# Patient Record
(demographics unavailable — no encounter records)

---

## 2024-10-10 NOTE — DISCUSSION/SUMMARY
[FreeTextEntry1] : She is a 79 year-old woman with a history of hypertension, hypercholesterolemia, hypothyroidism, rheumatoid arthritis and severe obstructive sleep apnea. On CPAP of 12 cm of water without any symptoms. S/P knee surgery in the fall of 2021. This was complicated by DVT. Was on Eliquis. U/S 5/2/22: No evidence of DVT.  Impression Abdominal wall mass was noted to be a fat-containing umbilical hernia on CT Anemia Obstructive sleep apnea, well controlled Pulmonary nodule on CT scan -Mucoid impaction Right kidney lesion noted on CT - not well seen on US  H/O DVT  Recommend High-dose influenza vaccination given in the left deltoid Follow-up with gastroenterology advised Follow-up with endocrinology and rheumatology I will see her again in 3 months -Sooner if needed

## 2024-10-10 NOTE — PHYSICAL EXAM
[Normal Appearance] : normal appearance [General Appearance - In No Acute Distress] : no acute distress [Heart Rate And Rhythm] : heart rate and rhythm were normal [Heart Sounds] : normal S1 and S2 [Exaggerated Use Of Accessory Muscles For Inspiration] : no accessory muscle use [Auscultation Breath Sounds / Voice Sounds] : lungs were clear to auscultation bilaterally [Abnormal Walk] : normal gait [Cyanosis, Localized] : no localized cyanosis [3+ Pitting] : 3+  pitting  [Skin Turgor] : normal skin turgor [No Focal Deficits] : no focal deficits [Oriented To Time, Place, And Person] : oriented to person, place, and time [] : no respiratory distress

## 2024-10-10 NOTE — REVIEW OF SYSTEMS
[Hypertension] : ~T hypertension [Nasal Discharge] : nasal discharge [Heartburn] : heartburn [Back Pain] : ~T back pain [Arthralgias] : arthralgias [Thyroid Problem] : thyroid problem [Fatigue] : no fatigue [Recent Wt Gain (___ Lbs)] : no recent weight gain [Nasal Congestion] : no nasal congestion [Postnasal Drip] : no postnasal drip [Cough] : no cough [Dyspnea] : no dyspnea [Wheezing] : no wheezing [Palpitations] : no palpitations [Edema] : ~T edema was not present [Constipation] : no constipation [Diarrhea] : no diarrhea [Abdominal Pain] : no abdominal pain [Bleeding] : no bleeding [Dysuria] : no dysuria [Kyphoscoliosis] : no kyphoscoliosis [Myalgias] : no myalgias [Rash] : no [unfilled] rash [Anemia] : no anemia [Headache] : no headache [DVT] : no DVT

## 2024-10-10 NOTE — HISTORY OF PRESENT ILLNESS
[Never] : never [Obstructive Sleep Apnea] : obstructive sleep apnea [TextBox_4] : Has been seeing endocrinology.  She was asking me about vitamin D supplementation.  I supported her interest to do so.  No new physical complaints.  Continues to use CPAP.  Has not seen gastroenterology in several months.

## 2024-10-10 NOTE — PROCEDURE
[FreeTextEntry1] : PSG 4/23/12: Severe obstructive sleep apnea. AHI 55.9.   CT Chest 11/18/21: No evidence of pulmonary emboli. Small nodules, largest measuring 7 mm.  CT Chest 8/16/23: Branching tubular opacities noted in the right upper lobe and left lower lobe.  1.5 cm lesion in the right kidney.  Ultrasound advised.  CT of the abdomen and pelvis 7/25/2024: Fat-containing periumbilical hernia.  U/S 5/2/22: No evidence of DVT.  Renal US 2/29/24: Right renal lesion was not well-visualized.

## 2025-01-16 NOTE — REASON FOR VISIT
[Follow-up] : a follow-up of an existing diagnosis [FreeTextEntry1] : incomplete evacuation, anemia, gerd, colon cancer screening

## 2025-01-16 NOTE — HISTORY OF PRESENT ILLNESS
[de-identified] : 2/8/24: Erythema in the lower third of the esophagus, acute gastritis 9/2007: Severe gastritis, hiatal hernia, Schatzki ring [FreeTextEntry1] : 9/2007: Internal hemorrhoids, diverticulosis. Repeat 5-7 years

## 2025-01-16 NOTE — ASSESSMENT
[FreeTextEntry1] : 81 y/o female presents today for follow up results s/p EGD 2/8/24. Hx DM2 on metformin. Last seen by Dr. Byrd 1/4/24. EGD resulted with minimal inflammation. Reviewed results with patient. Patient states that she has been taking rabeprazole daily with noted improvement of GERD symptoms. Patient wants to finish rest of rabeprazole supply (90 days) and then follow up to possibly taper or switch to another medication. Patient is aware that she is due for a colonoscopy, wants to defer at this time. Denies any sob, cp or abdominal pain. Patient notes some stomach discomfort from metformin medication, which she started taking approximately 1 year ago.   1/16/25-  referred for colon cancer screening, last procedure  more than ten years ago. Pt requests colonoscopy. Hb 11.3 hx of DM. Has constipation. EGD 2024 was essentially normal. Recent gerd after eating eggplant parmesan; Using generic aciphex. Not using fiber.   Plan: 1. Continue rabeprazole 2. She was advised to undergo colonoscopy to which she  agreed. The procedure will be performed in [facility] with the assistance of an anesthesiologist. The procedure was explained in detail and she understood the risks of the procedure not limited  to infection, bleeding, perforation, risk of anesthesia and risk of IV site problems,emergency surgery, missed lesions  or non-diagnosis of colon cancer. She  was advised that she could not drive home alone, if the patient chooses to receive sedation. Further diagnostic and treatment recommendations will be based upon the procedure and any biopsies, if they are taken. 3. increaese fiber or use metamucil

## 2025-01-16 NOTE — PHYSICAL EXAM
[Alert] : alert [Normal Voice/Communication] : normal voice/communication [Healthy Appearing] : healthy appearing [No Acute Distress] : no acute distress [Sclera] : the sclera and conjunctiva were normal [Hearing Threshold Finger Rub Not [place]] : hearing was normal [Normal Lips/Gums] : the lips and gums were normal [Oropharynx] : the oropharynx was normal [Normal Appearance] : the appearance of the neck was normal [No Neck Mass] : no neck mass was observed [No Respiratory Distress] : no respiratory distress [No Acc Muscle Use] : no accessory muscle use [Respiration, Rhythm And Depth] : normal respiratory rhythm and effort [Auscultation Breath Sounds / Voice Sounds] : lungs were clear to auscultation bilaterally [Heart Rate And Rhythm] : heart rate was normal and rhythm regular [Normal S1, S2] : normal S1 and S2 [Murmurs] : no murmurs [+1] : edema: +1 [Bowel Sounds] : normal bowel sounds [Abdomen Tenderness] : non-tender [No Masses] : no abdominal mass palpated [Abdomen Soft] : soft [] : no hepatosplenomegaly [Oriented To Time, Place, And Person] : oriented to person, place, and time

## 2025-03-06 NOTE — DISCUSSION/SUMMARY
[FreeTextEntry1] : She is a 80 year-old woman with a history of hypertension, hypercholesterolemia, hypothyroidism, rheumatoid arthritis and severe obstructive sleep apnea. On CPAP of 12 cm of water without any symptoms. S/P knee surgery in the fall of 2021. This was complicated by DVT. Was on Eliquis. U/S 5/2/22: No evidence of DVT.  Impression DEREK on CPAP Pulmonary nodule on CT scan -Mucoid impaction Right kidney lesion noted on CT Umbilical hernia H/O DVT  Recommend Blood work obtained Colonoscopy planned for the near future Follow-up with endocrinology and rheumatology I will see her again in 3 to 6 months

## 2025-03-06 NOTE — PHYSICAL EXAM
[Normal Appearance] : normal appearance [General Appearance - In No Acute Distress] : no acute distress [Heart Rate And Rhythm] : heart rate and rhythm were normal [Heart Sounds] : normal S1 and S2 [Exaggerated Use Of Accessory Muscles For Inspiration] : no accessory muscle use [Auscultation Breath Sounds / Voice Sounds] : lungs were clear to auscultation bilaterally [] : no hepato-splenomegaly [Abnormal Walk] : normal gait [Cyanosis, Localized] : no localized cyanosis [3+ Pitting] : 3+  pitting  [Skin Turgor] : normal skin turgor [No Focal Deficits] : no focal deficits [Oriented To Time, Place, And Person] : oriented to person, place, and time [Nail Clubbing] : no clubbing of the fingernails

## 2025-03-06 NOTE — HISTORY OF PRESENT ILLNESS
[Never] : never [Obstructive Sleep Apnea] : obstructive sleep apnea [TextBox_4] : She came for follow-up today.  No new physical complaints.  Continues to use CPAP.  She lives alone however she has help 2 days a week.

## 2025-07-17 NOTE — HISTORY OF PRESENT ILLNESS
[Never] : never [Obstructive Sleep Apnea] : obstructive sleep apnea [TextBox_4] : 3/6/2025: She came for follow-up today. No new physical complaints. Continues to use CPAP. She lives alone however she has help 2 days a week.  7/17/2025: She has been following up with endocrinology.  Blood work was obtained on her most recent visit and no changes were made to her therapeutic regimen.  She underwent colonoscopy in April 2025.  Diverticulosis was noted.  No specimens were collected.

## 2025-07-17 NOTE — PHYSICAL EXAM
[Normal Appearance] : normal appearance [General Appearance - In No Acute Distress] : no acute distress [Heart Rate And Rhythm] : heart rate and rhythm were normal [Heart Sounds] : normal S1 and S2 [Exaggerated Use Of Accessory Muscles For Inspiration] : no accessory muscle use [Auscultation Breath Sounds / Voice Sounds] : lungs were clear to auscultation bilaterally [] : no hepato-splenomegaly [Abnormal Walk] : normal gait [Nail Clubbing] : no clubbing of the fingernails [Cyanosis, Localized] : no localized cyanosis [3+ Pitting] : 3+  pitting  [Skin Turgor] : normal skin turgor [No Focal Deficits] : no focal deficits [Oriented To Time, Place, And Person] : oriented to person, place, and time

## 2025-07-17 NOTE — PHYSICAL EXAM
[Normal Appearance] : normal appearance [General Appearance - In No Acute Distress] : no acute distress [Heart Sounds] : normal S1 and S2 [Heart Rate And Rhythm] : heart rate and rhythm were normal [Exaggerated Use Of Accessory Muscles For Inspiration] : no accessory muscle use [Auscultation Breath Sounds / Voice Sounds] : lungs were clear to auscultation bilaterally [] : no hepato-splenomegaly [Abnormal Walk] : normal gait [Nail Clubbing] : no clubbing of the fingernails [Cyanosis, Localized] : no localized cyanosis [3+ Pitting] : 3+  pitting  [Skin Turgor] : normal skin turgor [No Focal Deficits] : no focal deficits [Oriented To Time, Place, And Person] : oriented to person, place, and time

## 2025-07-17 NOTE — REVIEW OF SYSTEMS
[Hypertension] : ~T hypertension [Nasal Discharge] : nasal discharge [Heartburn] : heartburn [Back Pain] : ~T back pain [Arthralgias] : arthralgias [Thyroid Problem] : thyroid problem [Fever] : no fever [Fatigue] : no fatigue [Recent Wt Gain (___ Lbs)] : no recent weight gain [Nasal Congestion] : no nasal congestion [Postnasal Drip] : no postnasal drip [Cough] : no cough [Sputum] : not coughing up ~M sputum [Dyspnea] : no dyspnea [Wheezing] : no wheezing [Palpitations] : no palpitations [Edema] : ~T edema was not present [Constipation] : no constipation [Diarrhea] : no diarrhea [Abdominal Pain] : no abdominal pain [Bleeding] : no bleeding [Dysuria] : no dysuria [Kyphoscoliosis] : no kyphoscoliosis [Myalgias] : no myalgias [Rash] : no [unfilled] rash [Anemia] : no anemia [Headache] : no headache [DVT] : no DVT

## 2025-07-17 NOTE — DISCUSSION/SUMMARY
[FreeTextEntry1] : She is an 80 year-old woman with a history of hypertension, hypercholesterolemia, hypothyroidism, rheumatoid arthritis and severe obstructive sleep apnea. On CPAP of 12 cm of water without any symptoms. S/P knee surgery in the fall of 2021. This was complicated by DVT. Was on Eliquis. U/S 5/2/22: No evidence of DVT.  Impression DEREK on CPAP - Stable Pulmonary nodule on CT scan -Mucoid impaction Right kidney lesion noted on CT -Renal cyst reported on CT from July 2024 Umbilical hernia H/O DVT  Recommend Continue with CPAP Atenolol renewed Follow-up with endocrinology and rheumatology -Has been complaining of back pain and arthritic pain I will see her again in 3 to 6 months   Time spent preparing for the visit, interviewing and examining the patient, reviewing data and imaging, discussing the recommendations with the patient and completing documentation was 35 minutes excluding separate billable services.